# Patient Record
Sex: FEMALE | Race: BLACK OR AFRICAN AMERICAN
[De-identification: names, ages, dates, MRNs, and addresses within clinical notes are randomized per-mention and may not be internally consistent; named-entity substitution may affect disease eponyms.]

---

## 2018-07-22 ENCOUNTER — HOSPITAL ENCOUNTER (EMERGENCY)
Dept: HOSPITAL 68 - ERH | Age: 23
End: 2018-07-22
Payer: COMMERCIAL

## 2018-07-22 VITALS — SYSTOLIC BLOOD PRESSURE: 118 MMHG | DIASTOLIC BLOOD PRESSURE: 56 MMHG

## 2018-07-22 DIAGNOSIS — N20.0: Primary | ICD-10-CM

## 2018-07-22 DIAGNOSIS — N23: ICD-10-CM

## 2018-07-22 LAB
ABSOLUTE GRANULOCYTE CT: 4.7 /CUMM (ref 1.4–6.5)
BASOPHILS # BLD: 0 /CUMM (ref 0–0.2)
BASOPHILS NFR BLD: 0.4 % (ref 0–2)
EOSINOPHIL # BLD: 0.1 /CUMM (ref 0–0.7)
EOSINOPHIL NFR BLD: 1.2 % (ref 0–5)
ERYTHROCYTE [DISTWIDTH] IN BLOOD BY AUTOMATED COUNT: 16.8 % (ref 11.5–14.5)
GRANULOCYTES NFR BLD: 47.6 % (ref 42.2–75.2)
HCT VFR BLD CALC: 34.2 % (ref 37–47)
LYMPHOCYTES # BLD: 4.3 /CUMM (ref 1.2–3.4)
MCH RBC QN AUTO: 21.8 PG (ref 27–31)
MCHC RBC AUTO-ENTMCNC: 31.6 G/DL (ref 33–37)
MCV RBC AUTO: 69.1 FL (ref 81–99)
MONOCYTES # BLD: 0.6 /CUMM (ref 0.1–0.6)
PLATELET # BLD: 488 /CUMM (ref 130–400)
PMV BLD AUTO: 8.3 FL (ref 7.4–10.4)
RED BLOOD CELL CT: 4.95 /CUMM (ref 4.2–5.4)
WBC # BLD AUTO: 9.8 /CUMM (ref 4.8–10.8)

## 2018-07-22 NOTE — ED GI/GU/ABDOMINAL COMPLAINT
History of Present Illness
 
General
Chief Complaint: Abdominal Pain/Flank Pain
Stated Complaint: RT SIDED ABD PAIN SINCE THIS AM
Source: patient
Exam Limitations: no limitations
 
Vital Signs & Intake/Output
Vital Signs & Intake/Output
 Vital Signs
 
 
Date Time Temp Pulse Resp B/P B/P Pulse O2 O2 Flow FiO2
 
     Mean Ox Delivery Rate 
 
 1623 98.1 68 18 118/56  99 Room Air  
 
 1451       Room Air  
 
 1450  68 18 156/89  99 Room Air  
 
 1406 99.0 79 18 142/82  97 Room Air Room Air 
 
 
 
Allergies
Coded Allergies:
No Known Allergies (18)
 
Reconcile Medications
Oxycodone HCl/Acetaminophen (Percocet 5-325 MG Tablet) 5 MG-325 MG TABLET   1 
TAB PO TID PRN KIDNEY STONE
Propranolol HCl 10 MG TABLET   1 TAB PO PRN ANXIETY  (Reported)
 
Triage Note:
TRIAGE:
 22 Y/O FEMALE PRESENTS C/O LRQ ABDOMINAL PAIN AND
 NAUSEA SICNE THIS MORNING. DENIES URINARY
 SYMPTOMS.
Triage Nurses Notes Reviewed? yes
Pregnant? n
Is pt currently breastfeeding? No
HPI:
24yo female who presents today with abdominal pain that started this morning. Pt
ate out for dinner yesterday and felt fine when she went to bed last night.  Pt 
woke up with sharp pain localized to the right upper quadrant with radiation to 
the back and pelvic region. She has had no appetite since the morning and has 
not eaten anything all day. Pt also complains of diarrhea that started this 
morning. Denies any fevers, chills, vomitting, chest pain, shortness of breathe,
dysuria, gross blood. LMP was at the end of  and is expected at any time. Pt
denies any chance of pregnancy as she in a monogomous relationship with her 
female fiance. No sick contacts. 
 
Past History
 
Travel History
Traveled to [place] past 21 day No
 
Medical History
Any Pertinent Medical History? see below for history
Neurological: NONE
EENT: NONE
Cardiovascular: NONE
Respiratory: NONE
Gastrointestinal: NONE
Hepatic: NONE
Renal: NONE
Musculoskeletal: NONE
Psychiatric: depression
Endocrine: NONE
Blood Disorders: NONE
Cancer(s): NONE
GYN/Reproductive: NONE
 
Surgical History
Surgical History: non-contributory
 
Psychosocial History
What is your primary language English
Tobacco Use: Never used
ETOH Use: occasional use
Illicit Drug Use: marijuana
 
Family History
Hx Contributory? No
 
Review of Systems
 
Review of Systems
Constitutional:
Reports: no symptoms. 
EENTM:
Reports: no symptoms. 
Respiratory:
Reports: no symptoms. 
Cardiovascular:
Reports: no symptoms. 
GI:
Reports: no symptoms. 
Genitourinary:
Reports: no symptoms. 
Musculoskeletal:
Reports: no symptoms. 
Skin:
Reports: no symptoms. 
Neurological/Psychological:
Reports: no symptoms. 
Hematologic/Endocrine:
Reports: no symptoms. 
Immunologic/Allergic:
Reports: no symptoms. 
All Other Systems: Reviewed and Negative
 
Physical Exam
 
Physical Exam
General Appearance: well developed/nourished, no apparent distress
Head: atraumatic, normal appearance
Eyes:
Bilateral: normal appearance. 
Ears, Nose, Throat, Mouth: hearing grossly normal, moist mucous membrane
Neck: normal inspection, supple, full range of motion
Respiratory: normal breath sounds, chest non-tender, no respiratory distress
Cardiovascular: regular rate/rhythm
Gastrointestinal: soft, non-tender
Back: normal inspection, normal range of motion
Extremities: normal range of motion
Neurologic/Psych: awake, alert, oriented x 3, normal mood/affect
Skin: intact, normal color, warm/dry
 
Core Measures
ACS in differential dx? No
Sepsis Present: No
Sepsis Focused Exam Completed? No
 
Progress
Differential Diagnosis: AAA, AMI, appendicitis, biliary colic, bowel obstruction
, colon cancer, cholecystitis, diverticulitis, ectopic pregnancy, endometritis, 
esophageal varices, gastritis, hepatitis, hernia, hemorrhoids, ischemic bowel, 
inflamm bowel dis, intrauterine pregnancy, kidney stone, Cheli-Camilla tear, 
ovarian cyst, ovarian torsion, pancreatitis, PID/cervicitis, peptic ulcer, PUD/
GERD, perforated viscous, SBO, threatened AB, UTI/pyelo
Plan of Care:
 Orders
 
 
Procedure Date/time Status
 
URINE PREGNANCY  140 Complete
 
URINALYSIS  140 Complete
 
LIPASE  140 Complete
 
COMPREHENSIVE METABOLIC PANEL  140 Complete
 
CBC WITHOUT DIFFERENTIAL 1406 Complete
 
 
 Laboratory Tests
 
 
 
18 1517:
Urine Color YEL, Urine Clarity CLEAR, Urine pH 6.0, Ur Specific Gravity 1.025, 
Urine Protein NEG, Urine Ketones NEG, Urine Nitrite NEG, Urine Bilirubin NEG, 
Urine Urobilinogen 0.2, Ur Leukocyte Esterase NEG, Ur Microscopic SEDIMENT 
EXAMINED, Urine RBC FEW  H, Urine WBC 1-3  H, Ur Epithelial Cells FEW, Urine 
Bacteria FEW  H, Urine Hemoglobin TRACE-INTACT, Urine Glucose NEG, Urine 
Pregnancy Test NEGATIVE
 
18 1444:
Anion Gap 12, Estimated GFR > 60, BUN/Creatinine Ratio 16.3, Glucose 94, Calcium
9.5, Total Bilirubin 0.3, AST 15, ALT 28, Alkaline Phosphatase 63, Total Protein
6.8, Albumin 3.8, Globulin 3.0, Albumin/Globulin Ratio 1.3, Lipase 46, CBC w 
Diff NO MAN DIFF REQ, RBC 4.95, MCV 69.1  L, MCH 21.8  L, MCHC 31.6  L, RDW 16.8
 H, MPV 8.3, Gran % 47.6, Lymphocytes % 44.2, Monocytes % 6.6, Eosinophils % 1.2
, Basophils % 0.4, Absolute Granulocytes 4.7, Absolute Lymphocytes 4.3  H, 
Absolute Monocytes 0.6, Absolute Eosinophils 0.1, Absolute Basophils 0
 
Diagnostic Imaging:
Viewed by Me: CT Scan, Ultrasound.  Discussed w/RAD: CT Scan, Ultrasound. 
Radiology Impression: PATIENT: KYLER JEONG  MEDICAL RECORD NO: [id number] PRESENT
AGE: 23  PATIENT ACCOUNT NO: [id number] : 93  LOCATION: [facility] ORDERING 
PHYSICIAN: Geri Sen MD     SERVICE DATE:  EXAM TYPE: US - US
-LIMITED ABDOMEN EXAMINATION: US ABDOMEN LIMITED CLINICAL INFORMATION: Right 
upper quadrant pain.. COMPARISON: None TECHNIQUE: Real-time imaging of the right
upper quadrant abdominal viscera. Color Doppler exam used. FINDINGS: PANCREAS: 
Normal. LIVER: There is mild increased diffuse echogenicity of liver parenchyma 
due to fatty change. The liver is slightly prominent in size measuring 19 cm 
superior inferior. No focal liver lesion. No intrahepatic bile duct dilatation. 
GALLBLADDER: Normal. The gallbladder is physiologically distended without 
evidence of stones, sludge, polyps, wall thickening or pericholecystic fluid. 
COMMON BILE DUCT: Normal in caliber measuring 0.2 cm in diameter. RIGHT KIDNEY: 
Normal. No hydronephrosis. No renal calculi or focal parenchymal lesions. The 
kidney measures 12.7 cm in maximum dimension. FREE FLUID: None. IMPRESSION: 1. 
No acute abnormality. 2. Diffuse fatty change of liver. Mild hepatomegaly. 3. No
gallstone or acute change of gallbladder. No bile duct dilatation. DICTATED BY: 
Glenn Guerrero MD  DATE/TIME DICTATED:18 :RAD.RUCKER  
DATE/TIME TRANSCRIBED:18 CONFIDENTIAL, DO NOT COPY WITHOUT 
APPROPRIATE AUTHORIZATION.  <Electronically signed in Other Vendor System>      
                                                                                
SIGNED BY: Glenn Guerrero MD 18 [id number], PATIENT: KYLER JEONG  MEDICAL RECORD
NO: [id number] PRESENT AGE: 23  PATIENT ACCOUNT NO: [id number] : 93  LOCATION:
[facility] ORDERING PHYSICIAN: Geri Sen MD     SERVICE DATE:  EXAM 
TYPE: CAT - CT ABD & PELVIS W/O IV CONTRAS EXAMINATION: CT ABDOMEN AND PELVIS 
WITHOUT CONTRAST CLINICAL INFORMATION: Right upper quadrant pain. Right flank 
pain. COMPARISON: None TECHNIQUE: Multidetector volumetric imaging was performed
from the superior aspect of the liver through the pubic symphysis. Sagittal and 
coronal reformatted images were obtained on the technologist's workstation. DLP:
1293.88 mGy-cm FINDINGS: LUNG BASES: The visualized lung bases are unremarkable.
LIVER, GALLBLADDER, AND BILIARY TREE: No focal liver lesion. No intrapelvic bile
duct dilatation. The right lobe of liver measures 20 cm superior inferior. The 
gallbladder is unremarkable with no evidence of radiopaque gallstones, 
gallbladder wall thickening, or obvious pericholecystic inflammatory changes. 
PANCREAS: Unremarkable. SPLEEN: Unremarkable. ADRENAL GLANDS: Unremarkable. 
KIDNEYS AND URETERS: There is no hydronephrosis of either kidney. There is 
however a 4 x 5 mm stone at the right ureteropelvic junction, axial image 276 (3
). There is no hydroureter. Left kidney and collecting system are normal. The 
kidneys are of normal size and contour. BLADDER: Unremarkable. GASTROINTESTINAL 
TRACT: The small and large bowel are unremarkable. The appendix is unremarkable.
ABDOMINAL WALL: No significant hernia is appreciated. LYMPH NODES: Normal. 
VASCULAR: Unremarkable. PELVIC VISCERA: There is a right adnexal cystic lesion 
likely related to the ovary measuring 4.5 x 3.7 x 3.1 cm. Density measurement 14
Hounsfield units. The left ovary measures 2.6 x 2.9 x 2.5 cm. The uterus is 
anteverted. There is no fluid in the cul-de-sac. OSSEOUS STRUCTURES: 
Unremarkable. IMPRESSION: 1. 4 x 5 mm stone at the right ureterovesical junction
without hydronephrosis or hydroureter. 2. Right adnexal cyst measuring 4.5 cm.
Initial ED EKG: none
 
Departure
 
Departure
Disposition: HOME OR SELF CARE
Condition: Stable
Clinical Impression
Primary Impression: Nephrolithiasis
Secondary Impressions: Renal colic on right side
Referrals:
Magui HAQUE,Jakub LEVY
 
Patient Has No Primary Care Dr (PCP/Family)
 
Additional Instructions:
Follow up with the urologist.  Drink plenty of water.  You can take Motrin for 
mild pain and Percocet for severe pain, but do not drive or operate machinery 
while taking Percocet, as it may cause you to be sleepy or confused.  If you 
experience fever, chills, change in urine, worsening pain, or any other new or 
worsening symptom, return to ER.
Departure Forms:
Customer Survey
General Discharge Information
Prescriptions:
Current Visit Scripts
Oxycodone HCl/Acetaminophen (Percocet 5-325 MG Tablet) 1 TAB PO TID PRN KIDNEY 
STONE 
     #15 TAB

## 2018-07-22 NOTE — ULTRASOUND REPORT
EXAMINATION:
US ABDOMEN LIMITED
 
CLINICAL INFORMATION:
Right upper quadrant pain..
 
COMPARISON:
None
 
TECHNIQUE:
Real-time imaging of the right upper quadrant abdominal viscera. Color
Doppler exam used.
 
FINDINGS:
 
PANCREAS: Normal.
 
LIVER: There is mild increased diffuse echogenicity of liver parenchyma due
to fatty change. The liver is slightly prominent in size measuring 19 cm
superior inferior.
No focal liver lesion. No intrahepatic bile duct dilatation.
 
GALLBLADDER: Normal. The gallbladder is physiologically distended without
evidence of stones, sludge, polyps, wall thickening or pericholecystic fluid.
 
COMMON BILE DUCT: Normal in caliber measuring 0.2 cm in diameter.
 
RIGHT KIDNEY: Normal. No hydronephrosis. No renal calculi or focal
parenchymal lesions. The kidney measures 12.7 cm in maximum dimension.
 
FREE FLUID: None.
 
IMPRESSION:
 
1. No acute abnormality.
2. Diffuse fatty change of liver. Mild hepatomegaly.
3. No gallstone or acute change of gallbladder. No bile duct dilatation.

## 2018-07-22 NOTE — CT SCAN REPORT
EXAMINATION:
CT ABDOMEN AND PELVIS WITHOUT CONTRAST
 
CLINICAL INFORMATION:
Right upper quadrant pain. Right flank pain.
 
COMPARISON:
None
 
TECHNIQUE:
Multidetector volumetric imaging was performed from the superior aspect of
the liver through the pubic symphysis. Sagittal and coronal reformatted
images were obtained on the technologist's workstation.
 
DLP:
1293.88 mGy-cm
 
FINDINGS:
 
LUNG BASES: The visualized lung bases are unremarkable.
 
LIVER, GALLBLADDER, AND BILIARY TREE: No focal liver lesion. No intrapelvic
bile duct dilatation. The right lobe of liver measures 20 cm superior
inferior.
 
The gallbladder is unremarkable with no evidence of radiopaque gallstones,
gallbladder wall thickening, or obvious pericholecystic inflammatory changes.
 
 
PANCREAS: Unremarkable.
 
SPLEEN: Unremarkable.
 
ADRENAL GLANDS: Unremarkable.
 
KIDNEYS AND URETERS: There is no hydronephrosis of either kidney. There is
however a 4 x 5 mm stone at the right ureteropelvic junction, axial image 276
(3). There is no hydroureter. Left kidney and collecting system are normal.
The kidneys are of normal size and contour.
 
BLADDER: Unremarkable.
 
GASTROINTESTINAL TRACT: The small and large bowel are unremarkable. The
appendix is unremarkable.
 
ABDOMINAL WALL: No significant hernia is appreciated.
 
LYMPH NODES: Normal.
 
VASCULAR: Unremarkable.
 
PELVIC VISCERA: There is a right adnexal cystic lesion likely related to the
ovary measuring 4.5 x 3.7 x 3.1 cm. Density measurement 14 Hounsfield units.
The left ovary measures 2.6 x 2.9 x 2.5 cm. The uterus is anteverted. There
is no fluid in the cul-de-sac.
 
OSSEOUS STRUCTURES: Unremarkable.
 
IMPRESSION:
 
1. 4 x 5 mm stone at the right ureterovesical junction without hydronephrosis
or hydroureter.
2. Right adnexal cyst measuring 4.5 cm.

## 2018-07-28 ENCOUNTER — HOSPITAL ENCOUNTER (OUTPATIENT)
Dept: HOSPITAL 68 - ERH | Age: 23
Setting detail: OBSERVATION
LOS: 1 days | End: 2018-07-29
Attending: UROLOGY | Admitting: UROLOGY
Payer: COMMERCIAL

## 2018-07-28 VITALS — WEIGHT: 260 LBS | HEIGHT: 63 IN | BODY MASS INDEX: 46.07 KG/M2

## 2018-07-28 VITALS — DIASTOLIC BLOOD PRESSURE: 74 MMHG | SYSTOLIC BLOOD PRESSURE: 116 MMHG

## 2018-07-28 DIAGNOSIS — N20.1: Primary | ICD-10-CM

## 2018-07-28 DIAGNOSIS — F41.9: ICD-10-CM

## 2018-07-28 DIAGNOSIS — F32.9: ICD-10-CM

## 2018-07-28 DIAGNOSIS — N20.0: ICD-10-CM

## 2018-07-28 DIAGNOSIS — Z87.442: ICD-10-CM

## 2018-07-28 DIAGNOSIS — R50.9: ICD-10-CM

## 2018-07-28 LAB
ABSOLUTE GRANULOCYTE CT: 6.3 /CUMM (ref 1.4–6.5)
BASOPHILS # BLD: 0 /CUMM (ref 0–0.2)
BASOPHILS NFR BLD: 0.3 % (ref 0–2)
EOSINOPHIL # BLD: 0.1 /CUMM (ref 0–0.7)
EOSINOPHIL NFR BLD: 0.8 % (ref 0–5)
ERYTHROCYTE [DISTWIDTH] IN BLOOD BY AUTOMATED COUNT: 16.7 % (ref 11.5–14.5)
GRANULOCYTES NFR BLD: 66.9 % (ref 42.2–75.2)
HCT VFR BLD CALC: 31.7 % (ref 37–47)
LYMPHOCYTES # BLD: 2 /CUMM (ref 1.2–3.4)
MCH RBC QN AUTO: 21.7 PG (ref 27–31)
MCHC RBC AUTO-ENTMCNC: 31.9 G/DL (ref 33–37)
MCV RBC AUTO: 67.9 FL (ref 81–99)
MONOCYTES # BLD: 1 /CUMM (ref 0.1–0.6)
PLATELET # BLD: 448 /CUMM (ref 130–400)
PMV BLD AUTO: 8.1 FL (ref 7.4–10.4)
RED BLOOD CELL CT: 4.67 /CUMM (ref 4.2–5.4)
WBC # BLD AUTO: 9.4 /CUMM (ref 4.8–10.8)

## 2018-07-28 PROCEDURE — G0378 HOSPITAL OBSERVATION PER HR: HCPCS

## 2018-07-28 PROCEDURE — C2617 STENT, NON-COR, TEM W/O DEL: HCPCS

## 2018-07-28 NOTE — ED GENERAL ADULT
History of Present Illness
 
General
Chief Complaint: General Adult
Stated Complaint: "KIDNEY STONE,HIGH HEART RT,FEVER" 111 ON PULSE OX
Source: patient, family
Exam Limitations: no limitations
 
Vital Signs & Intake/Output
Vital Signs & Intake/Output
 Vital Signs
 
 
Date Time Temp Pulse Resp B/P B/P Pulse O2 O2 Flow FiO2
 
     Mean Ox Delivery Rate 
 
07/28 1821 99.5 87 18 137/67  97 Room Air  
 
07/28 1656 102.4        
 
07/28 1551 100.7 103 18 127/85  98 Room Air  
 
 
 
Allergies
Coded Allergies:
oxycodone (VOMITING 07/28/18)
 
Reconcile Medications
Oxycodone HCl/Acetaminophen (Percocet 5-325 MG Tablet) 5 MG-325 MG TABLET   1 
TAB PO TID PRN KIDNEY STONE
Propranolol HCl 10 MG TABLET   1 TAB PO PRN ANXIETY  (Reported)
 
Triage Note:
PT WAS DX W NEPHROLITHIASIS LAST WEEK TO RIGHT
 SIDE, HAS APPT WITH DR AVILA AUG 6 FOR LITHOTRIPSY
 BUT FEELS THE PAIN HAS BEEN GETTING WORSE, MOVING
 MORE CENTRALLY TO BACK AND HAS HAD FEVERS. STATES
 TEMP  AT HOME, 100.7 IN TRIAGE. HAS BEEN
 TAKING 1,500MG OF TYLENOL AT A TIME AND STATES SHE
 WAS TOLD NOT TO TAKE NSAIDS.
Triage Nurses Notes Reviewed? yes
Pregnant: No
Patient currently breastfeeds: No
HPI:
23-year-old female comes in with pain in the right side.  She reports that she 
was recently diagnosed with renal colic.  She was seen by the urologist and had 
an appointment for stent placement.  She continues to have pain and started 
having fevers with worsening pain so she came in for evaluation.  She denies 
vomiting.  She denies diarrhea.  
 
Past History
 
Travel History
Traveled to [place] past 21 day No
 
Medical History
Any Pertinent Medical History? see below for history
Neurological: NONE
EENT: NONE
Cardiovascular: NONE
Respiratory: NONE
Gastrointestinal: NONE
Hepatic: NONE
Renal: NONE
Musculoskeletal: NONE
Psychiatric: depression
Endocrine: NONE
Blood Disorders: NONE
Cancer(s): NONE
GYN/Reproductive: NONE
 
Surgical History
Surgical History: non-contributory
 
Psychosocial History
What is your primary language English
Tobacco Use: Never used
ETOH Use: denies use
Illicit Drug Use: marijuana
 
Family History
Hx Contributory? Yes
 
Review of Systems
 
Review of Systems
Constitutional:
Reports: chills, fever. 
EENTM:
Denies: blurred vision, double vision, visual changes. 
Respiratory:
Denies: cough, hemoptysis, orthopnea. 
Cardiovascular:
Denies: chest pain, edema, orthopena. 
GI:
Reports: abdominal pain, nausea.  Denies: bloating, constipation. 
Genitourinary:
Denies: dysuria, frequency, hematuria. 
Musculoskeletal:
Denies: back pain, gout, joint pain. 
Skin:
Denies: cysts, change in skin color. 
Neurological/Psychological:
Denies: anxiety, ataxia. 
 
Physical Exam
 
Physical Exam
General Appearance: well developed/nourished, no apparent distress, alert, awake
Head: atraumatic, normal appearance
Eyes:
Bilateral: EOMI, pale conjunctivae. 
Ears, Nose, Throat: normal pharynx, normal ENT inspection
Neck: normal inspection, supple
Respiratory: normal breath sounds, chest non-tender, no respiratory distress
Gastrointestinal: see below
Back: normal inspection, normal range of motion
Extremities: normal inspection, normal capillary refill, normal range of motion
Neurologic/Psych: no motor/sensory deficits, awake, alert, oriented x 3
Skin: intact, normal color, warm/dry
Comments:
RLQ tendereness and R CVAT. no preitoneal signs. no rash. normal BS
 
Core Measures
ACS in differential dx? No
CVA/TIA Diagnosis: No
Sepsis Present: No
Sepsis Focused Exam Completed? No
 
Progress
Differential Diagnoses
Review of old  records from July 22 showed that the patient had a right UVJ 
stone.  She now had complete relief, she continued to have the pain and saw the 
urologist.
 
The patient now has signs of urinary infection with a fever.  We will check a 
lactate, start the patient on antibiotics.  I have spoken with the urologist, 
Dr. Avila and she will accept patient for admission.
 
 
 
Plan of Care:
 Orders
 
 
Procedure Date/time Status
 
Nothing by Mouth 07/29 B Active
 
CBC WITHOUT DIFFERENTIAL 07/29 0600 Active
 
BASIC ELECTROLYTES PLUS BUN&CR 07/29 0600 Active
 
Regular Diet 07/28 D Complete
 
Pathway - chart 07/28 1752 Active
 
Patient Data 07/28 1752 Active
 
Code Status 07/28 1752 Active
 
Place in observation 07/28 1731 Active
 
ED Holding Orders 07/28 1731 Active
 
Code Status 07/28 1731 Complete
 
Saline Lock 07/28 1706 Active
 
LACTIC ACID 07/28 1706 Complete
 
CULTURE,URINE 07/28 1550 Active
 
URINALYSIS 07/28 1550 Complete
 
LIPASE 07/28 1550 Complete
 
CBC WITHOUT DIFFERENTIAL 07/28 1550 Complete
 
BASIC METABOLIC PANEL 07/28 1550 Complete
 
VTE Mechanical Prophylaxis 07/28  UNK Active
 
Vital Signs 07/28  UNK Active
 
Intake & Output 07/28  UNK Active
 
Activity/Ambulation 07/28  UNK Active
 
 
 Current Medications
 
 
  Sig/Renny Start time  Last
 
Medication Dose  Stop Time Status Admin
 
Ceftriaxone Sodium 1,000 MG DAILY@1730 07/29 1730 AC 
 
(Rocephin)     
 
Acetaminophen 1,000 MG Q6H 07/28 1800 AC 
 
(Ofirmev)   07/29 1214  
 
N/A 1 UNIT    
 
(No Carrier)     
 
Dextrose/Sodium  1,000 ML .Q10H 07/28 1800 AC 
 
Chloride     
 
(D5W-1/2 Normal      
 
Saline 1000ML)     
 
Morphine Sulfate 4 MG Q3P PRN 07/28 1800 AC 
 
(MORPHINE SULFATE)     
 
Morphine Sulfate 6 MG Q3P PRN 07/28 1800 AC 
 
(MORPHINE SULFATE)     
 
Ondansetron HCl 4 MG Q8P PRN 07/28 1800 AC 
 
(Zofran)     
 
 
 Laboratory Tests
 
 
 
07/28/18 1630:
Urine Color YEL, Urine Clarity HAZY  H, Urine pH 6.0, Ur Specific Gravity 1.015,
Urine Protein TRACE  H, Urine Ketones 15  H, Urine Nitrite NEG, Urine Bilirubin 
NEG, Urine Urobilinogen 4.0  H, Ur Leukocyte Esterase SMALL  H, Ur Microscopic 
SEDIMENT EXAMINED, Urine RBC 3-5, Urine WBC 3-5  H, Ur Epithelial Cells MOD  H, 
Urine Bacteria MANY  H, Urine Hemoglobin SMALL  H, Urine Glucose NEG
 
07/28/18 1624:
Lactic Acid 0.9
 
07/28/18 1624:
Anion Gap 11, Estimated GFR > 60, BUN/Creatinine Ratio 8.0, Glucose 87, Calcium 
9.1, Lipase 31, CBC w Diff NO MAN DIFF REQ, RBC 4.67, MCV 67.9  L, MCH 21.7  L, 
MCHC 31.9  L, RDW 16.7  H, MPV 8.1, Gran % 66.9, Lymphocytes % 21.7, Monocytes %
10.3  H, Eosinophils % 0.8, Basophils % 0.3, Absolute Granulocytes 6.3, Absolute
Lymphocytes 2.0, Absolute Monocytes 1.0  H, Absolute Eosinophils 0.1, Absolute 
Basophils 0
 Microbiology
07/28 1630  URINE ROUT: Urine Culture - RECD
 
 
Initial ED EKG: none
 
Departure
 
Departure
Time of Disposition: 1719
Disposition: STILL A PATIENT
Condition: Stable
Clinical Impression
Primary Impression: Renal colic
Secondary Impressions: Renal colic on right side, UTI (urinary tract infection)
Referrals:
Patient Has No Primary Care Dr (PCP/Family)
 
Departure Forms:
Customer Survey
General Discharge Information
 
Observation Note
Spoke With:
Sonya Avila MD
Rationale for Observation:
My rational for observation is as follows infected kidney stone, the patient 
will have a stent placed and needs IV antibiotics to prevent renal failure and 
sepsis.
 
 
Critical Care Note
 
Critical Care Note
Critical Care Time: non-applicable

## 2018-07-28 NOTE — HISTORY & PHYSICAL PRE-OP
General Information and HPI
Source of Information: patient
History of Present Illness:
This is a 23 year-old female with a history of anxiety and depression who 
presents with right sided flank pain with associated fever of 102 F. She reports
taking three 500 mg tabs of Tylenol at home today. She reports that she was 
recently diagnosed at [facility] with a right kidney stone last week. She 
followed up with Dr. Avila and was scheduled to have a lithotripsy on 8/6/18. 
Due to worsening and persistent pain, she returned to the ER for further 
evaluation. She reports increased urine frequency due to increaed oral intake. 
She denies hematuia, dysuria, chills, night sweats, nausea, vomiting or bowel 
changes. 
 
 
Allergies/Medications
Allergies:
Coded Allergies:
oxycodone (VOMITING 07/28/18)
 
Home Med list
Oxycodone HCl/Acetaminophen (Percocet 5-325 MG Tablet) 5 MG-325 MG TABLET   1 
TAB PO TID PRN KIDNEY STONE
Propranolol HCl 10 MG TABLET   1 TAB PO PRN ANXIETY  (Reported)
 
 
Past History
 
Medical History
Neurological: NONE
EENT: NONE
Cardiovascular: NONE
Respiratory: NONE
Gastrointestinal: NONE
Hepatic: NONE
Renal: NONE
Musculoskeletal: NONE
Psychiatric: anxiety, depression
Endocrine: NONE
Blood Disorders: NONE
Cancer(s): NONE
GYN/Reproductive: NONE
 
Surgical History
Pertinent Surgical History: non-contributory
 
Past Family/Social History
 
Psychosocial History
ETOH Use: denies use
Illicit Drug Use: denies illicit drug use
 
Employment History
Employment: Employed ([employer])
 
Review of Systems
Review of Systems:
Refer to H&P
 
Exam & Diagnostic Data
Last 24 Hrs of Vital Signs/I&O
 Vital Signs
 
 
Date Time Temp Pulse Resp B/P B/P Pulse O2 O2 Flow FiO2
 
     Mean Ox Delivery Rate 
 
07/28 1821 99.5 87 18 137/67  97 Room Air  
 
07/28 1656 102.4        
 
07/28 1551 100.7 103 18 127/85  98 Room Air  
 
 
 Intake & Output
 
 
 07/28 1600 07/28 0800 07/28 0000
 
Intake Total   
 
Output Total   
 
Balance   
 
    
 
Patient 260 lb  
 
Weight   
 
 
 
Physical Exam:
General: Resting comfortably in nad
HEENT: NC/AT, sclera anicteric, moist mucus membranes
Cardiac: S1S2 noted, RRR
Lungs: Good inspiratory effort, CTAB
Abdomen: Soft, obese, with right mid-lower quadrant tenderness extending to the 
lateral side, no cva tenderness noted B/L, no rebound or guarding noted
Extremities: no significant edema or calf tenderness
Last 24 Hrs of Labs/Trey:
 Laboratory Tests
 
07/28/18 1630:
Urine Color YEL, Urine Clarity HAZY  H, Urine pH 6.0, Ur Specific Gravity 1.015,
Urine Protein TRACE  H, Urine Ketones 15  H, Urine Nitrite NEG, Urine Bilirubin 
NEG, Urine Urobilinogen 4.0  H, Ur Leukocyte Esterase SMALL  H, Ur Microscopic 
SEDIMENT EXAMINED, Urine RBC 3-5, Urine WBC 3-5  H, Ur Epithelial Cells MOD  H, 
Urine Bacteria MANY  H, Urine Hemoglobin SMALL  H, Urine Glucose NEG
 
07/28/18 1624:
Lactic Acid 0.9
 
07/28/18 1624:
Anion Gap 11, Estimated GFR > 60, BUN/Creatinine Ratio 8.0, Glucose 87, Calcium 
9.1, Lipase 31, CBC w Diff NO MAN DIFF REQ, RBC 4.67, MCV 67.9  L, MCH 21.7  L, 
MCHC 31.9  L, RDW 16.7  H, MPV 8.1, Gran % 66.9, Lymphocytes % 21.7, Monocytes %
10.3  H, Eosinophils % 0.8, Basophils % 0.3, Absolute Granulocytes 6.3, Absolute
Lymphocytes 2.0, Absolute Monocytes 1.0  H, Absolute Eosinophils 0.1, Absolute 
Basophils 0
 Microbiology
07/28 1630  URINE ROUT: Urine Culture - RECD
 
 
 
Diagnostic Data
Other Results
SERVICE DATE: 07/22/
EXAM TYPE: CAT - CT ABD & PELVIS W/O IV CONTRAS
 
EXAMINATION:
CT ABDOMEN AND PELVIS WITHOUT CONTRAST
 
CLINICAL INFORMATION:
Right upper quadrant pain. Right flank pain.
 
COMPARISON:
None
 
TECHNIQUE:
Multidetector volumetric imaging was performed from the superior aspect of
the liver through the pubic symphysis. Sagittal and coronal reformatted
images were obtained on the technologist's workstation.
 
DLP:
1293.88 mGy-cm
 
FINDINGS:
 
LUNG BASES: The visualized lung bases are unremarkable.
 
LIVER, GALLBLADDER, AND BILIARY TREE: No focal liver lesion. No intrapelvic
bile duct dilatation. The right lobe of liver measures 20 cm superior
inferior.
 
The gallbladder is unremarkable with no evidence of radiopaque gallstones,
gallbladder wall thickening, or obvious pericholecystic inflammatory changes.
 
 
PANCREAS: Unremarkable.
 
SPLEEN: Unremarkable.
 
ADRENAL GLANDS: Unremarkable.
 
KIDNEYS AND URETERS: There is no hydronephrosis of either kidney. There is
however a 4 x 5 mm stone at the right ureteropelvic junction, axial image 276
(3). There is no hydroureter. Left kidney and collecting system are normal.
The kidneys are of normal size and contour.
 
BLADDER: Unremarkable.
 
GASTROINTESTINAL TRACT: The small and large bowel are unremarkable. The
appendix is unremarkable.
 
ABDOMINAL WALL: No significant hernia is appreciated.
 
LYMPH NODES: Normal.
 
VASCULAR: Unremarkable.
 
PELVIC VISCERA: There is a right adnexal cystic lesion likely related to the
ovary measuring 4.5 x 3.7 x 3.1 cm. Density measurement 14 Hounsfield units.
The left ovary measures 2.6 x 2.9 x 2.5 cm. The uterus is anteverted. There
is no fluid in the cul-de-sac.
 
OSSEOUS STRUCTURES: Unremarkable.
 
IMPRESSION:
 
1. 4 x 5 mm stone at the right ureterovesical junction without hydronephrosis
or hydroureter.
2. Right adnexal cyst measuring 4.5 cm.
 
 
Assessment/Plan
Assessment/Plan:
This is an 23 year-old female with a history of anxitey, depression and a known 
4 x 5 mm stone at the right ureterovesical junction who presents with renal 
colic and associated fever. 
 
Patient will be placed in observation in anticipation of surgical intervention 
tomorrow morning for stent placement with Dr. Avila. She may have a regular diet
for dinner and kept npo after midnight with IV fluids. She recieved IV Rocephin 
in the ER, which she will remain on until surgery tomorrow. She will be provided
with IV analgesics, antipyretics and antimetics. Plan of care discussed with Dr. Avila who is in agreement.
 
As Ranked By This Provider
Problem List:
 1. Renal colic on right side

## 2018-07-29 VITALS — SYSTOLIC BLOOD PRESSURE: 122 MMHG | DIASTOLIC BLOOD PRESSURE: 82 MMHG

## 2018-07-29 VITALS — SYSTOLIC BLOOD PRESSURE: 118 MMHG | DIASTOLIC BLOOD PRESSURE: 70 MMHG

## 2018-07-29 VITALS — DIASTOLIC BLOOD PRESSURE: 78 MMHG | SYSTOLIC BLOOD PRESSURE: 124 MMHG

## 2018-07-29 NOTE — SURGICAL DISCHARGE SUMMARY
Visit Information
 
Visit Dates
Admission Date:
07/28/18
 
Discharge Date:
7/29/18
 
 
History of Present Illness
Chief Complaint:
Right renal colic and fever.
 
Medical History
Blood Transfusion Hx: No
Neurological: NONE
EENT: NONE
Cardiovascular: NONE
Respiratory: NONE
Gastrointestinal: NONE
Hepatic: NONE
Renal: NONE, nephrolithiasis
Musculoskeletal: NONE
Psychiatric: anxiety, depression
Endocrine: NONE
Blood Disorders: NONE
Cancer(s): NONE
GYN/Reproductive: NONE
Isolation History: Standard
 
Surgical History
Pertinent Surgical History: non-contributory
 
Psychosocial History
Where Do You Live? Home
Services at Home: None
What is Your Primary Language? English
ETOH Use: denies use
Review of Systems:
Significant significant only for right renal colic and fever
Physical Exam:
Right CVA tenderness and fever that resolved with IV antibiotics
 
Hospital Course
 
Course
Attending Physician:
Sonya Avila MD
 
Primary Care Physician:
Patient Has No Primary Care Dr
 
Hospital Course:
Uneventful after receiving IV ceftriaxone
Complications:
None
Allergies:
Coded Allergies:
oxycodone (VOMITING 07/28/18)
 
Significant Procedures:
Right ureteral stent placement and cystoscopy
Pertinent Lab Results:
None
 
Disposition Summary
 
Disposition
Principal Diagnosis:
Right renal colic with obstructing stone and fever
Additional Diagnosis:
No other diagnosis
Discharge Disposition: home or self care
 
Discharge Instructions
 
General Discharge Information
Code Status: Full Code
Patient's Diet:
Regular
Patient's Activity:
Regular
Follow-Up Instructions/Appts:
Follow-up on August 6 for extracorporeal shockwave lithotripsy in the [facility]
 
Medications at Discharge
Discharge Medications:
Continue taking these medications:
Oxycodone HCl/Acetaminophen (Percocet 5-325 MG Tablet) 5 MG-325 MG TABLET
    1 Tablet ORAL THREE TIMES DAILY as needed for KIDNEY STONE
    Qty = 15
 
Propranolol HCl (Propranolol HCl) 10 MG TABLET
    1 Tablet ORAL  as needed for ANXIETY

## 2018-07-29 NOTE — PN- STUDENT
Amira Sagastume 07/29/18 0657:
Subjective
Subjective:
This morning she is feeling much better than when she came into the ED 
yesterday. Had some slight nausea last night requiring zofran, no epidoes of 
vomiting. Tolerated dinner last night. Pt's only complaint at this time is right
sided flank pain that is well controlled w/ pain regimen. Denies cp,sob, dysuria
, hematuria. Pt is scheduled to go for right sided stent placement today. 
 
Objective
Objective:
 Vital Signs
 
 
Date Time Temp Pulse Resp B/P B/P Pulse O2 O2 Flow FiO2
 
     Mean Ox Delivery Rate 
 
07/29 0620 98.8 90 18 118/70  97 Room Air  
 
07/29 0012 99.1        
 
07/28 2159 98.2 92 18 116/74  98   
 
07/28 2000 99.2        
 
07/28 1821 99.5 87 18 137/67  97 Room Air  
 
07/28 1656 102.4        
 
07/28 1551 100.7 103 18 127/85  98 Room Air  
 
 
 Last 24 Hours I&Os
 
 
 07/29 0800 07/29 0000 07/28 1600
 
Intake Total  1400 
 
Output Total   
 
Balance  1400 
 
    
 
Intake, IV  1300 
 
Intake, Oral  100 
 
Patient  260 lb 260 lb
 
Weight   
 
 
 Laboratory Tests
 
 
 
07/28/18 1630:
Urine Color YEL, Urine Clarity HAZY  H, Urine pH 6.0, Ur Specific Gravity 1.015,
Urine Protein TRACE  H, Urine Ketones 15  H, Urine Nitrite NEG, Urine Bilirubin 
NEG, Urine Urobilinogen 4.0  H, Ur Leukocyte Esterase SMALL  H, Ur Microscopic 
SEDIMENT EXAMINED, Urine RBC 3-5, Urine WBC 3-5  H, Ur Epithelial Cells MOD  H, 
Urine Bacteria MANY  H, Urine Hemoglobin SMALL  H, Urine Glucose NEG
 
07/28/18 1624:
Lactic Acid 0.9
 
07/28/18 1624:
Anion Gap 11, Estimated GFR > 60, BUN/Creatinine Ratio 8.0, Glucose 87, Calcium 
9.1, Lipase 31, CBC w Diff NO MAN DIFF REQ, RBC 4.67, MCV 67.9  L, MCH 21.7  L, 
MCHC 31.9  L, RDW 16.7  H, MPV 8.1, Gran % 66.9, Lymphocytes % 21.7, Monocytes %
10.3  H, Eosinophils % 0.8, Basophils % 0.3, Absolute Granulocytes 6.3, Absolute
Lymphocytes 2.0, Absolute Monocytes 1.0  H, Absolute Eosinophils 0.1, Absolute 
Basophils 0
 Microbiology
 
 
Date/Time Procedure - Status
 
Source Growth
 
07/28 1630 Urine Culture - RECD
 
URINE ROUT 
 
 
 Orders
 
 
Procedure Date/time Status
 
Nothing by Mouth 07/29 B Active
 
CBC WITHOUT DIFFERENTIAL 07/29 0600 Active
 
BASIC ELECTROLYTES PLUS BUN&CR 07/29 0600 Active
 
Regular Diet 07/28 D Complete
 
Vital Signs 07/28 2037 Active
 
Teach/Educate 07/28 2037 Active
 
Pain Treatment and Response 07/28 2037 Active
 
Nutritional Intake, Monitor 07/28 2037 Active
 
Isolation 07/28 2037 Active
 
Intake & Output 07/28 2037 Active
 
Patient Care Conference 07/28 2037 Active
 
Activity/Ambulation 07/28 2037 Active
 
Pathway - chart 07/28 1752 Active
 
Patient Data 07/28 1752 Active
 
Code Status 07/28 1752 Active
 
Place in observation 07/28 1731 Active
 
ED Holding Orders 07/28 1731 Active
 
Code Status 07/28 1731 Complete
 
Saline Lock 07/28 1706 Active
 
LACTIC ACID 07/28 1706 Complete
 
CULTURE,URINE 07/28 1550 Active
 
URINALYSIS 07/28 1550 Complete
 
LIPASE 07/28 1550 Complete
 
CBC WITHOUT DIFFERENTIAL 07/28 1550 Complete
 
BASIC METABOLIC PANEL 07/28 1550 Complete
 
VTE Mechanical Prophylaxis 07/28  UNK Active
 
Vital Signs 07/28  UNK Active
 
Intake & Output 07/28  UNK Active
 
Activity/Ambulation 07/28  UNK Active
 
 
Gen: O&Ax3, laying down in bed, in no apparent distress 
Lungs: CTA
Heart: distant heart sounds, S1 and S2 normal, RRR
Abd: hypoactive bs, soft, nondistended, right llq and right flank pain upon 
palpation, no guarding, no rigidity 
LE: no edema, skin is warm and dry 
 
Assessment/Plan
Assessment:
Pt is a 24 yo female with a PMHx of anxiety and depression that presented with a
fever and worsening pain yesterday from a right sided renal stone. 
Plan:
-Pt will go for stent placement today 
-NPO until surgery 
-C/w IV fluids 
-Rocephin perioperatively 
-ALPS for dvt ppx
-No merino, voiding ok
-IV tylenol for pain 
-Urine culture pending 
-Encourage oob and ambulation
-Advance diet after surgery as tolerated
-Pt may be d/c'd later today depending on how procedure goes
 
 
Gildardo Valdez 07/29/18 0709:
Resident Review Statement
Other Findings:
Patient seen and examed. Agree with student PA-S note. In short, this is a 24 yo
f w/ pmhx of anxiety and depression that presented yesterday with fever with 
known right kidney stone. She is currently under obs for planned stent by Dr. Avila this morning. She is to remain NPO, IVF hydration , analgesia, antiematics
and can likely be dishcarge to home post op depending on how the procedure goes.
 
 
*Will discuss with attending

## 2018-07-29 NOTE — OPERATIVE REPORT
Operative/Inv Procedure Report
Surgery Date: 07/29/18
Name of Procedure:
right ureteral stent placment and cystoscopy
Pre-Operative Diagnosis:
right renal stone at [facility] with fever
Post-Operative Diagnosis:
same
Estimated Blood Loss: scant
Surgeon/Assistant:
Sonya Avila MD
 
Anesthesia: laryngeal mask airway
Drains:
6x24cm stent
Complications:
none
Condition:
stable
Operative Indication:
right renal colic with obstructing stone and fever
 
Operative/Procedure Note
Note:
23-year-old female with a history of right renal stone 5 mm at the [facility].  She had
continuing and worsening right renal colic with fever of 102.  She was admitted 
was for IV antibiotics and her fever improved.  She was consented for right 
renal ureteral stent to relieve a possible obstruction.  She would remain on the
OR schedule for shockwave lithotripsy on August 6.  The risks benefits and 
alternatives of the stent were given and she wished to proceed.  All questions 
were answered.  She was marked on the right hand.
 
She was taken to the operating room and placed on the operating table in the 
supine position.  Timeout was performed and IV antibiotics had been given 
previously.  She was placed in the dorsolithotomy position after LMA anesthesia 
was begun.  She was prepped and draped in standard sterile fashion.  Fluoroscopy
was set up.  Cystoscopy was performed the bladder was globally inspected.  There
were no abnormalities appreciated and the ureteral orifices were in their normal
anatomic position.  The right ureteral orifice was cannulated with a solo part 
guidewire and seen to be in the renal pelvis by fluoroscopic imaging.  A 6 x 24 
cm ureteral stent was placed over the wire without difficulty.  It was seen to 
be in good position and the wire was removed.  The proximal portion was seen in 
the renal pelvis and the distal portion was seen in the bladder fluoroscopically
and visually.  The bladder was emptied.  Patient was cleaned of the Betadine 
solution.  She was transferred to recovery room stable condition.
Findings:
No abnormalities in the bladder and well positioned right ureteral stent
Discharge Disposition: PACU

## 2018-07-29 NOTE — RADIOLOGY REPORT
EXAMINATION:
X-RAY URETEROSCOPY IN THE OPERATING ROOM
 
CLINICAL INFORMATION:
Right cystoscopy with stent placement.
 
COMPARISON:
CT abdomen and pelvis 07/22/2018.
 
TECHNIQUE:
Intraoperative fluoroscopic guidance was provided in the operating room for
Dr. Avila to perform right cystoscopy and stent placement.
 
Fluoroscopy time: 0.4 minutes.
 
Number of images: 3.
 
FINDINGS:
Internal ureteral stent placement on the right. Refer to operative notes for
details.
 
IMPRESSION:
Administrative dictation for intraoperative fluoroscopic guidance for
cystoscopy and stent placement. Refer to operative notes for details.

## 2018-07-29 NOTE — CONS- UROLOGY
General Information and HPI
 
Consulting Request
Date of Consult: 07/29/18
Requested By:
ER
 
Reason for Consult:
right renal colic
Source of Information: patient
Exam Limitations: no limitations
History of Present Illness:
This is a 23 year-old female with a history of anxiety and depression who 
presents with right sided flank pain with associated fever of 102 F. She reports
taking three 500 mg tabs of Tylenol at home today. She reports that she was 
recently diagnosed at [facility] with a right kidney stone last week. She 
followed up with Dr. Avila and was scheduled to have a lithotripsy on 8/6/18. 
Due to worsening and persistent pain, she returned to the ER for further 
evaluation. She reports increased urine frequency due to increased oral intake. 
She denies hematuria, dysuria, chills, night sweats, nausea, vomiting or bowel 
changes. 
-------------------------
She was consented for a cystoscopy and right stent placement given her pain and 
fever.  She was givne rocephin and her fever has improved.  Her urine culture 
was negative.  She will remain on the OR schedule for Aug 6 for the [facility]. 
 
 
Allergies/Medications
Allergies:
Coded Allergies:
oxycodone (VOMITING 07/28/18)
 
Home Med List:
Oxycodone HCl/Acetaminophen (Percocet 5-325 MG Tablet) 5 MG-325 MG TABLET   1 
TAB PO TID PRN KIDNEY STONE
Propranolol HCl 10 MG TABLET   1 TAB PO PRN ANXIETY  (Reported)
 
Current Medications:
 Current Medications
 
 
  Sig/Renny Start time  Last
 
Medication Dose Route Stop Time Status Admin
 
Acetaminophen 1,000 MG Q6H 07/28 1800 AC 07/29
 
N/A 1 UNIT IV 07/29 1214  0605
 
Acetaminophen 0 .STK-MED ONE 07/28 1726 DC 
 
  PO   
 
Acetaminophen 650 MG ONCE ONE 07/28 1715 DC 07/28
 
  PO 07/28 1716  1740
 
Ceftriaxone Sodium 1,000 MG DAILY@1730 07/29 1730 AC 
 
  IV   
 
Ceftriaxone Sodium 0 .STK-MED ONE 07/28 1726 DC 
 
  .ROUTE   
 
Ceftriaxone Sodium 1,000 MG ONE ONE 07/28 1709 DC 07/28
 
  IV 07/28 1710  1740
 
Dextrose/Sodium  1,000 ML .Q10H 07/28 1800 AC 07/29
 
Chloride  IV   0605
 
Hydromorphone HCl 0 .STK-MED ONE 07/28 1726 DC 
 
  .ROUTE   
 
Hydromorphone HCl 0.5 MG ONCE ONE 07/28 1715 DC 07/28
 
  IV 07/28 1716  1740
 
Morphine Sulfate 2 MG Q3P PRN 07/29 0715 AC 
 
  IV   
 
Morphine Sulfate 4 MG Q3P PRN 07/29 0715 AC 
 
  IV   
 
Morphine Sulfate 4 MG Q3P PRN 07/28 1800 DC 
 
  IV   
 
Morphine Sulfate 6 MG Q3P PRN 07/28 1800 DC 
 
  IV   
 
Ondansetron HCl 4 MG Q8P PRN 07/28 1800 AC 
 
  IV   
 
Ondansetron HCl 0 .STK-MED ONE 07/28 1725 DC 
 
  .ROUTE   
 
Ondansetron HCl 4 MG ONCE ONE 07/28 1715 DC 07/28
 
  IV 07/28 1716  1740
 
Propranolol HCl 10 MG DAILY 07/29 0900 AC 
 
  PO   
 
Sodium Chloride 1,000 ML BOLUS ONE 07/28 1715 DC 07/28
 
  IV 07/28 1814  1740
 
 
 
 
Past History
 
Medical History
Blood Transfusion Hx: No
Neurological: NONE
EENT: NONE
Cardiovascular: NONE
Respiratory: NONE
Gastrointestinal: NONE
Hepatic: NONE
Renal: NONE, nephrolithiasis
Musculoskeletal: NONE
Psychiatric: anxiety, depression
Endocrine: NONE
Blood Disorders: NONE
Cancer(s): NONE
GYN/Reproductive: NONE
 
Surgical History
Pertinent Surgical History: non-contributory
 
Psychosocial History
Where Do You Live? Home
Services at Home: None
Primary Language: English
Smoking Status: Never Smoked
ETOH Use: denies use
Illicit Drug Use: denies illicit drug use
 
Functional Ability
ADLs
Independent: dressing, eating, toileting, bathing. 
Ambulation: independent
IADLs
Independent: shopping, housework, finances, food prep, telephone, transportation
, medication admin. 
 
Employment History
Employment: Employed ([employer])
Retired? no
 
Review of Systems
 
Review of Systems
Constitutional:
Denies: no symptoms. 
EENTM:
Reports: no symptoms. 
Cardiovascular:
Reports: no symptoms. 
Respiratory:
Reports: no symptoms. 
GI:
Reports: no symptoms. 
Genitourinary:
Reports: pain. 
Musculoskeletal:
Reports: back pain. 
Skin:
Reports: no symptoms. 
Neurological/Psychological:
Reports: no symptoms. 
Hematologic/Endocrine:
Reports: no symptoms. 
Immunologic/Allergic:
Reports: no symptoms. 
 
Exam & Diagnostic Data
Vital Signs and I&O
Vital Signs
 
 
Date Time Temp Pulse Resp B/P B/P Pulse O2 O2 Flow FiO2
 
     Mean Ox Delivery Rate 
 
07/29 0900 97.9 83 20 124/78  98   
 
07/29 0800       Room Air  
 
07/29 0620 98.8 90 18 118/70  97 Room Air  
 
07/29 0012 99.1        
 
07/28 2159 98.2 92 18 116/74  98   
 
07/28 2000 99.2        
 
07/28 1821 99.5 87 18 137/67  97 Room Air  
 
07/28 1656 102.4        
 
07/28 1551 100.7 103 18 127/85  98 Room Air  
 
 
 Intake & Output
 
 
 07/29 1600 07/29 0800 07/29 0000 07/28 1600 07/28 0800 07/28 0000
 
Intake Total  900 1400   
 
Output Total      
 
Balance  900 1400   
 
       
 
Intake, IV  900 1300   
 
Intake, Oral   100   
 
Patient   117.934 kg 117.934 kg  
 
Weight      
 
 
 
 
Physical Exam
General Appearance: well developed/nourished, no apparent distress, alert, awake
, comfortable
Head: atraumatic, normal appearance
Eyes:
Bilateral: normal appearance. 
Ears, Nose, Throat: normal ENT inspection
Neck: normal inspection
Respiratory: normal breath sounds
Gastrointestinal: soft, non-tender
Rectal: deferred
Extremities: normal inspection
Neurologic/Psych: awake, alert, oriented x 3
Cranial Nerves: normal hearing, normal speech
Skin: intact, normal color, warm/dry
Reproductive: Normal female genitalia
Last 24 Hours of Labs:
 Laboratory Tests
 
 
 07/29 07/28 07/28
 
 0600 1630 1624
 
Chemistry   
 
  Sodium Cancelled  
 
  Potassium Cancelled  
 
  Chloride Cancelled  
 
  Carbon Dioxide Cancelled  
 
  Anion Gap Cancelled  
 
  BUN Cancelled  
 
  Creatinine Cancelled  
 
  BUN/Creatinine Ratio Cancelled  
 
  Lactic Acid (0.7 - 2.1 mmol/L)   0.9
 
Hematology   
 
  CBC w Diff Cancelled  
 
  WBC Cancelled  
 
  RBC Cancelled  
 
  Hgb Cancelled  
 
  Hct Cancelled  
 
  MCV Cancelled  
 
  MCH Cancelled  
 
  MCHC Cancelled  
 
  RDW Cancelled  
 
  Plt Count Cancelled  
 
  MPV Cancelled  
 
Urines   
 
  Urine Color (YEL,AMB,STR)  YEL 
 
  Urine Clarity (CLEAR)  HAZY  H 
 
  Urine pH (5.0 - 8.0)  6.0 
 
  Ur Specific Gravity (1.001 - 1.035)  1.015 
 
  Urine Protein (NEG,<30 MG/DL)  TRACE  H 
 
  Urine Ketones (NEG)  15  H 
 
  Urine Nitrite (NEG)  NEG 
 
  Urine Bilirubin (NEG)  NEG 
 
  Urine Urobilinogen (0.1  -  1.0 EU/dl)  4.0  H 
 
  Ur Leukocyte Esterase (NEG)  SMALL  H 
 
  Ur Microscopic  SEDIMENT EXAMINED 
 
  Urine RBC (0 - 5 /HPF)  3-5 
 
  Urine WBC (0 - 2 /HPF)  3-5  H 
 
  Ur Epithelial Cells (NONE,FEW)  MOD  H 
 
  Urine Bacteria (NEG/NONE)  MANY  H 
 
  Urine Hemoglobin (NEG)  SMALL  H 
 
  Urine Glucose (N MG/DL)  NEG 
 
 
 
 
 07/28
 
 1624
 
Chemistry 
 
  Sodium (137 - 145 mmol/L) 135  L
 
  Potassium (3.5 - 5.1 mmol/L) 4.2
 
  Chloride (98 - 107 mmol/L) 99
 
  Carbon Dioxide (22 - 30 mmol/L) 25
 
  Anion Gap (5 - 16) 11
 
  BUN (7 - 17 mg/dL) 8
 
  Creatinine (0.5 - 1.0 mg/dL) 1.0
 
  Estimated GFR (>60 ml/min) > 60
 
  BUN/Creatinine Ratio (7 - 25 %) 8.0
 
  Glucose (65 - 99 mg/dL) 87
 
  Calcium (8.4 - 10.2 mg/dL) 9.1
 
  Lipase (23 - 300 U/L) 31
 
Hematology 
 
  CBC w Diff NO MAN DIFF REQ
 
  WBC (4.8 - 10.8 /CUMM) 9.4
 
  RBC (4.20 - 5.40 /CUMM) 4.67
 
  Hgb (12.0 - 16.0 G/DL) 10.1  L
 
  Hct (37 - 47 %) 31.7  L
 
  MCV (81.0 - 99.0 FL) 67.9  L
 
  MCH (27.0 - 31.0 PG) 21.7  L
 
  MCHC (33.0 - 37.0 G/DL) 31.9  L
 
  RDW (11.5 - 14.5 %) 16.7  H
 
  Plt Count (130 - 400 /CUMM) 448  H
 
  MPV (7.4 - 10.4 FL) 8.1
 
  Gran % (42.2 - 75.2 %) 66.9
 
  Lymphocytes % (20.5 - 51.1 %) 21.7
 
  Monocytes % (1.7 - 9.3 %) 10.3  H
 
  Eosinophils % (0 - 5 %) 0.8
 
  Basophils % (0.0 - 2.0 %) 0.3
 
  Absolute Granulocytes (1.4 - 6.5 /CUMM) 6.3
 
  Absolute Lymphocytes (1.2 - 3.4 /CUMM) 2.0
 
  Absolute Monocytes (0.10 - 0.60 /CUMM) 1.0  H
 
  Absolute Eosinophils (0.0 - 0.7 /CUMM) 0.1
 
  Absolute Basophils (0.0 - 0.2 /CUMM) 0
 
 
 
Imaging Results:
renal US right [facility] stone 5mm
 
Assessment/Plan
Assessment/Plan
24yo female with a known right [facility] stone 5mm with renal colic and fever that was
new in onset.  She was admitted for IV abx and consented for right renal stent 
to relieve possible obstructed urinary system on the right.  IV ceftriaxone 
improved her fever and pain. 
Discharge home on po abx after stent placement. 
 
 
Consult Acknowledgment
- Thank you for your consult request.

## 2018-07-29 NOTE — PATIENT DISCHARGE INSTRUCTIONS
Discharge Instructions
 
General Discharge Information
You were seen/treated for:
Kidney stone
You had these procedures:
Kidney stent placement
Watch for these problems:
fever, chills, sweats, nausea, vomiting
 
Diet
Continue normal diet: Yes
 
Acute Coronary Syndrome
 
Inclusion Criteria
At DC or during hospital stay patient has or had the following:
ACS DIAGNOSIS No
 
Discharge Core Measures
Meds if any: Prescribed or Continued at Discharge
Meds if any: NOT Prescribed or Continued at Discharge
 
Congestive Heart Failure
 
Inclusion Criteria
At DC or during hospital stay patient has or had the following:
CHF DIAGNOSIS No
 
Discharge Core Measures
Meds if any: Prescribed or Continued at Discharge
Meds if any: NOT Prescribed or Continued at Discharge
 
Cerebrovascular accident
 
Inclusion Criteria
At DC or during hospital stay patient has or had the following:
CVA/TIA Diagnosis No
 
Discharge Core Measures
Meds if any: Prescribed or Continued at Discharge
Meds if any: NOT Prescribed or Continued at Discharge
 
Venous thromboembolism
 
Inclusion Criteria
VTE Diagnosis No
VTE Type NONE
VTE Confirmed by (Test) NONE
 
Discharge Core Measures
- Per Current guidelines, there needs to be overlap
- treatment for the first 5 days of Warfarin therapy.
- If discharged on Warfarin prior to 5 days of
- overlap therapy, the patient will need to be
- assessed for post discharge needs including
- *Post discharge parental anticoagulation
- *Warfarin and/or parental anticoagulation education
- *Follow up date to check INR post discharge
At least 5 days overlap therapy as Inpatient No
Meds if any: Prescribed or Continued at Discharge
Note: Overlap Therapy is Warfarin and Anticoagulant
Meds if any: NOT Prescribed or Continued at Discharge

## 2018-08-06 ENCOUNTER — HOSPITAL ENCOUNTER (OUTPATIENT)
Dept: HOSPITAL 68 - STS | Age: 23
End: 2018-08-06
Attending: UROLOGY
Payer: COMMERCIAL

## 2018-08-06 VITALS — HEIGHT: 63 IN | BODY MASS INDEX: 46.07 KG/M2 | WEIGHT: 260 LBS

## 2018-08-06 DIAGNOSIS — R10.11: ICD-10-CM

## 2018-08-06 DIAGNOSIS — N20.0: Primary | ICD-10-CM

## 2018-08-06 NOTE — OPERATIVE REPORT
Operative/Inv Procedure Report
Surgery Date: 08/06/18
Name of Procedure:
right ESWL
Pre-Operative Diagnosis:
right UPJ stone with stent already in place
Post-Operative Diagnosis:
right UP stone
Estimated Blood Loss: scant
Surgeon/Assistant:
Sonya Avila MD
 
Anesthesia: local monitored anesthesi
Complications:
none
Condition:
stable
Operative Indication:
right renal stone
 
Operative/Procedure Note
Note:
24yo female with a hx of RUQ pain on 7/22/18 accompanied by nausea and diarrhea 
that morning which prompted a  ER visit. CT scan demonstrated a Right UPJ 
stone 5mm in size. She has never had kidney stones before and has no fam hx 
either. She drinks water and juice but admits her urine is usually dark yellow. 
She has been drinking more water since the kidney stone diagnosis. She has no 
irritative urinary sx and no voiding complaints. No hx of urinary incontinence. 
She has no hx of hematuria or chronic UTIs. She has a MJ smoking habit QOD.  She
opted for right ESWL after having a stent placed urgently for obstructing stone 
recently.  She was given the risks, benefits and alternatives in the office and 
holding area.  All questions were answered before she signs the consent.  She 
was marked on the right side. 
 
Patient was taken to the OR and placed in the supine position. She was optimally
placed and time out was performed.  The stone was identified in the UP rather 
than the UPJ.  IV antibiotics were infused and IV sedation was begun.  She had a
total of 2500 shocks at a range of power 1-20.  She tolerated the well and was 
transferred to the recovery room in stable condition.  The stone was visibly 
changed at the end of the procedure. 
Findings:
stone visibly changed at the end of the case
Discharge Disposition: Same Day Admissions